# Patient Record
Sex: FEMALE | Race: WHITE | NOT HISPANIC OR LATINO | Employment: OTHER | URBAN - NONMETROPOLITAN AREA
[De-identification: names, ages, dates, MRNs, and addresses within clinical notes are randomized per-mention and may not be internally consistent; named-entity substitution may affect disease eponyms.]

---

## 2019-06-10 ENCOUNTER — OFFICE VISIT (OUTPATIENT)
Dept: URGENT CARE | Facility: CLINIC | Age: 84
End: 2019-06-10
Payer: MEDICARE

## 2019-06-10 VITALS
RESPIRATION RATE: 14 BRPM | WEIGHT: 140 LBS | HEIGHT: 62 IN | BODY MASS INDEX: 25.76 KG/M2 | TEMPERATURE: 96 F | DIASTOLIC BLOOD PRESSURE: 60 MMHG | HEART RATE: 85 BPM | OXYGEN SATURATION: 92 % | SYSTOLIC BLOOD PRESSURE: 110 MMHG

## 2019-06-10 DIAGNOSIS — J98.8 RTI (RESPIRATORY TRACT INFECTION): ICD-10-CM

## 2019-06-10 PROCEDURE — 99204 OFFICE O/P NEW MOD 45 MIN: CPT | Performed by: PHYSICIAN ASSISTANT

## 2019-06-10 RX ORDER — BENZONATATE 200 MG/1
200 CAPSULE ORAL 3 TIMES DAILY PRN
Qty: 30 CAP | Refills: 0 | Status: SHIPPED | OUTPATIENT
Start: 2019-06-10 | End: 2021-11-01

## 2019-06-10 RX ORDER — AZITHROMYCIN 250 MG/1
250 TABLET, FILM COATED ORAL DAILY
COMMUNITY
End: 2019-06-10

## 2019-06-10 RX ORDER — DOXYCYCLINE HYCLATE 100 MG
100 TABLET ORAL 2 TIMES DAILY
Qty: 14 TAB | Refills: 0 | Status: SHIPPED | OUTPATIENT
Start: 2019-06-10 | End: 2019-06-17

## 2019-06-10 RX ORDER — PREDNISONE 20 MG/1
TABLET ORAL
Qty: 6 TAB | Refills: 0 | Status: SHIPPED | OUTPATIENT
Start: 2019-06-10 | End: 2021-11-01

## 2019-06-10 RX ORDER — ESCITALOPRAM OXALATE 20 MG/1
20 TABLET ORAL DAILY
COMMUNITY

## 2019-06-10 RX ORDER — METHYLPREDNISOLONE 4 MG/1
4 TABLET ORAL DAILY
COMMUNITY
End: 2019-06-10

## 2019-06-10 RX ORDER — LEVOTHYROXINE SODIUM 75 UG/1
CAPSULE ORAL
COMMUNITY
End: 2022-06-01

## 2019-06-10 ASSESSMENT — ENCOUNTER SYMPTOMS
FEVER: 0
ABDOMINAL PAIN: 0
MYALGIAS: 0
SENSORY CHANGE: 0
WHEEZING: 0
DIARRHEA: 0
CHILLS: 0
COUGH: 1
HEMOPTYSIS: 0
HEADACHES: 1
SORE THROAT: 1
NAUSEA: 0
SPUTUM PRODUCTION: 1
SWEATS: 0
PALPITATIONS: 0
SINUS PAIN: 0
RHINORRHEA: 0
TINGLING: 0
FOCAL WEAKNESS: 0
SHORTNESS OF BREATH: 0
VOMITING: 0

## 2019-06-10 ASSESSMENT — COPD QUESTIONNAIRES: COPD: 0

## 2019-06-10 NOTE — LETTER
Kat 10, 2019         Patient: Denia Balderas   YOB: 1935   Date of Visit: 6/10/2019           To Whom it May Concern:    Denia Balderas was seen in my clinic on 6/10/2019. She is excused from work on 6/12/19 due to medical illness.    If you have any questions or concerns, please don't hesitate to call.        Sincerely,           Torri Guthrie P.A.-C.  Electronically Signed

## 2019-06-10 NOTE — PROGRESS NOTES
"Subjective:      Denia Balderas is a 84 y.o. female who presents with Cough (x4days )            Cough   This is a new problem. Episode onset: > 4 days. The problem has been unchanged. The cough is productive of sputum (yellow sputum). Associated symptoms include headaches, nasal congestion and a sore throat. Pertinent negatives include no chest pain, chills, ear pain, fever, hemoptysis, myalgias, postnasal drip, rash, rhinorrhea, shortness of breath, sweats or wheezing. Nothing aggravates the symptoms. Treatments tried: Z-mae, Medrol dosepack, Mucinex. The treatment provided no relief. There is no history of asthma, bronchitis, COPD or pneumonia.     Past Medical History:   Diagnosis Date   • Allergy    • Thyroid disease        History reviewed. No pertinent surgical history.    History reviewed. No pertinent family history.    No Known Allergies    Medications, Allergies, and current problem list reviewed today in Epic      Review of Systems   Constitutional: Positive for malaise/fatigue. Negative for chills and fever.   HENT: Positive for sore throat. Negative for congestion, ear discharge, ear pain, postnasal drip, rhinorrhea and sinus pain.    Respiratory: Positive for cough and sputum production. Negative for hemoptysis, shortness of breath and wheezing.    Cardiovascular: Negative for chest pain, palpitations and leg swelling.   Gastrointestinal: Negative for abdominal pain, diarrhea, nausea and vomiting.   Musculoskeletal: Negative for myalgias.   Skin: Negative for rash.   Neurological: Positive for headaches. Negative for tingling, sensory change and focal weakness.     All other systems reviewed and are negative.        Objective:     /60   Pulse 85   Temp (!) 35.6 °C (96 °F)   Resp 14   Ht 1.575 m (5' 2\")   Wt 63.5 kg (140 lb)   SpO2 92%   BMI 25.61 kg/m²      Physical Exam   Constitutional: She is oriented to person, place, and time. She appears well-developed and well-nourished. No " distress.   HENT:   Head: Normocephalic and atraumatic.   Right Ear: Tympanic membrane, external ear and ear canal normal.   Left Ear: Tympanic membrane, external ear and ear canal normal.   Nose: Nose normal.   Mouth/Throat: Uvula is midline, oropharynx is clear and moist and mucous membranes are normal.   Neck: Neck supple.   Cardiovascular: Normal rate, regular rhythm and normal heart sounds.  Exam reveals no gallop and no friction rub.    No murmur heard.  Pulmonary/Chest: Effort normal and breath sounds normal. No respiratory distress. She has no wheezes. She has no rales.   Deep spasmodic cough   Musculoskeletal: Normal range of motion. She exhibits no edema.   Lower extremities without edema. No calf TTP.   Lymphadenopathy:     She has no cervical adenopathy.   Neurological: She is alert and oriented to person, place, and time. No cranial nerve deficit.   Skin: Skin is warm and dry. No rash noted.   Psychiatric: She has a normal mood and affect. Her behavior is normal. Judgment and thought content normal.               Assessment/Plan:     1. RTI (respiratory tract infection)  doxycycline (VIBRAMYCIN) 100 MG Tab    predniSONE (DELTASONE) 20 MG Tab    benzonatate (TESSALON) 200 MG capsule     Stop Z-mae and Medrol.      •  doxycycline (VIBRAMYCIN) 100 MG Tab, Take 1 Tab by mouth 2 times a day for 7 days., Disp: 14 Tab, Rfl: 0  •  predniSONE (DELTASONE) 20 MG Tab, 2 tabs by mouth daily x 3 days., Disp: 6 Tab, Rfl: 0  •  benzonatate (TESSALON) 200 MG capsule, Take 1 Cap by mouth 3 times a day as needed for Cough., Disp: 30 Cap, Rfl: 0     Differential diagnoses, Supportive care, and indications for immediate follow-up discussed with patient.   Instructed to return to clinic or nearest emergency department for any change in condition, further concerns, or worsening of symptoms.    The patient demonstrated a good understanding and agreed with the treatment plan.    Torri Guthrie P.A.-C.

## 2021-11-01 ENCOUNTER — OFFICE VISIT (OUTPATIENT)
Dept: URGENT CARE | Facility: CLINIC | Age: 86
End: 2021-11-01
Payer: MEDICARE

## 2021-11-01 VITALS
SYSTOLIC BLOOD PRESSURE: 126 MMHG | TEMPERATURE: 97.8 F | BODY MASS INDEX: 26.87 KG/M2 | HEART RATE: 77 BPM | WEIGHT: 146 LBS | RESPIRATION RATE: 16 BRPM | DIASTOLIC BLOOD PRESSURE: 76 MMHG | OXYGEN SATURATION: 97 % | HEIGHT: 62 IN

## 2021-11-01 DIAGNOSIS — M25.512 ACUTE PAIN OF LEFT SHOULDER: ICD-10-CM

## 2021-11-01 PROCEDURE — 99213 OFFICE O/P EST LOW 20 MIN: CPT | Performed by: NURSE PRACTITIONER

## 2021-11-01 RX ORDER — METHYLPREDNISOLONE 4 MG/1
TABLET ORAL
Qty: 21 TABLET | Refills: 0 | Status: SHIPPED | OUTPATIENT
Start: 2021-11-01 | End: 2023-02-02

## 2021-11-01 ASSESSMENT — ENCOUNTER SYMPTOMS
TINGLING: 0
FOCAL WEAKNESS: 0
FEVER: 0
SENSORY CHANGE: 0
CHILLS: 0

## 2021-11-01 NOTE — PROGRESS NOTES
Subjective     Denia Balderas is a 86 y.o. female who presents with Shoulder Pain (L shoulder pain radiating to her L hand started few days ago)            HPI   New problem.  Patient is a 86-year-old female who presents with left shoulder pain that radiates down to her left hand times several days.  She denies injury to this area.  She has had previous issues with the shoulder that have required her to have a cortisone injection.  Patient is right-hand dominant.  She denies any focal weakness to this extremity, chest pain, or back pain.  She states the pain does radiate up into her neck.  She has been taking over-the-counter tylenol for her symptoms.    Patient has no known allergies.  Current Outpatient Medications on File Prior to Visit   Medication Sig Dispense Refill   • multivitamin (THERAGRAN) Tab Take 1 Tab by mouth every day.     • Levothyroxine Sodium 75 MCG Cap Take  by mouth.     • escitalopram (LEXAPRO) 20 MG tablet Take 20 mg by mouth every day.     • IRBESARTAN PO Take  by mouth.       No current facility-administered medications on file prior to visit.     Social History     Socioeconomic History   • Marital status:      Spouse name: Not on file   • Number of children: Not on file   • Years of education: Not on file   • Highest education level: Not on file   Occupational History   • Not on file   Tobacco Use   • Smoking status: Never Smoker   • Smokeless tobacco: Never Used   Substance and Sexual Activity   • Alcohol use: No   • Drug use: No   • Sexual activity: Not on file   Other Topics Concern   • Not on file   Social History Narrative   • Not on file     Social Determinants of Health     Financial Resource Strain:    • Difficulty of Paying Living Expenses:    Food Insecurity:    • Worried About Running Out of Food in the Last Year:    • Ran Out of Food in the Last Year:    Transportation Needs:    • Lack of Transportation (Medical):    • Lack of Transportation (Non-Medical):    Physical  "Activity:    • Days of Exercise per Week:    • Minutes of Exercise per Session:    Stress:    • Feeling of Stress :    Social Connections:    • Frequency of Communication with Friends and Family:    • Frequency of Social Gatherings with Friends and Family:    • Attends [employer] Services:    • Active Member of Clubs or Organizations:    • Attends Club or Organization Meetings:    • Marital Status:    Intimate Partner Violence:    • Fear of Current or Ex-Partner:    • Emotionally Abused:    • Physically Abused:    • Sexually Abused:      Breast Cancer-related family history is not on file.      Review of Systems   Constitutional: Negative for chills and fever.   Musculoskeletal: Positive for joint pain.   Skin: Negative for itching and rash.   Neurological: Negative for tingling, sensory change and focal weakness.              Objective     /76   Pulse 77   Temp 36.6 °C (97.8 °F)   Resp 16   Ht 1.575 m (5' 2\")   Wt 66.2 kg (146 lb)   SpO2 97%   BMI 26.70 kg/m²      Physical Exam  Vitals and nursing note reviewed.   Constitutional:       Appearance: Normal appearance. She is not ill-appearing.   Cardiovascular:      Rate and Rhythm: Normal rate and regular rhythm.      Heart sounds: No murmur heard.     Pulmonary:      Effort: Pulmonary effort is normal.      Breath sounds: Normal breath sounds.   Musculoskeletal:         General: Normal range of motion.      Left shoulder: Tenderness present. No bony tenderness or crepitus. Normal range of motion.        Arms:    Skin:     General: Skin is warm and dry.   Neurological:      General: No focal deficit present.      Mental Status: She is alert and oriented to person, place, and time.   Psychiatric:         Mood and Affect: Mood normal.         Thought Content: Thought content normal.                             Assessment & Plan        1. Acute pain of left shoulder  methylPREDNISolone (MEDROL DOSEPAK) 4 MG Tablet Therapy Pack     Possible " impingement.  Trial of medrol- no nsaids while taking this.  Icing.  Differential diagnosis, natural history, supportive care, and indications for immediate follow-up discussed at length. ]

## 2022-06-01 ENCOUNTER — OFFICE VISIT (OUTPATIENT)
Dept: URGENT CARE | Facility: CLINIC | Age: 87
End: 2022-06-01
Payer: MEDICARE

## 2022-06-01 VITALS
DIASTOLIC BLOOD PRESSURE: 68 MMHG | WEIGHT: 144.6 LBS | HEART RATE: 64 BPM | RESPIRATION RATE: 16 BRPM | TEMPERATURE: 98.3 F | BODY MASS INDEX: 27.3 KG/M2 | OXYGEN SATURATION: 97 % | HEIGHT: 61 IN | SYSTOLIC BLOOD PRESSURE: 136 MMHG

## 2022-06-01 DIAGNOSIS — L02.811 SCALP ABSCESS: ICD-10-CM

## 2022-06-01 PROCEDURE — 10061 I&D ABSCESS COMP/MULTIPLE: CPT | Performed by: PHYSICIAN ASSISTANT

## 2022-06-01 RX ORDER — ALENDRONATE SODIUM 70 MG/1
TABLET ORAL
COMMUNITY
Start: 2022-05-12

## 2022-06-01 RX ORDER — LEVOTHYROXINE SODIUM 75 UG/1
CAPSULE ORAL
COMMUNITY

## 2022-06-01 RX ORDER — DOXYCYCLINE HYCLATE 100 MG
100 TABLET ORAL 2 TIMES DAILY
Qty: 10 TABLET | Refills: 0 | Status: SHIPPED | OUTPATIENT
Start: 2022-06-01 | End: 2022-06-06

## 2022-06-01 ASSESSMENT — ENCOUNTER SYMPTOMS
HEADACHES: 0
MYALGIAS: 0
DIZZINESS: 0
COUGH: 0
CHILLS: 0
FEVER: 0
NECK PAIN: 0
PALPITATIONS: 0
NAUSEA: 0
VOMITING: 0

## 2022-06-01 NOTE — PROGRESS NOTES
Subjective:     CHIEF COMPLAINT  Chief Complaint   Patient presents with   • Bump     Behind head x 1 wk       HPI  Denia Balderas is a very pleasant 86 y.o. female who presents to the clinic with a painful bump to the left occipital scalp x1 week.  Patient states this has progressively enlarged in size over the last week.  Describes it is tender to the touch.  Believes it is an abscess.  She has been trying to apply warm compresses with no improvement.  She has not noticed any discharge or drainage.  Denies any associated fevers, chills or myalgias.  No nausea or vomiting.  No history of abscess formation.  No history of fungal infection.    REVIEW OF SYSTEMS  Review of Systems   Constitutional: Negative for chills, fever and malaise/fatigue.   Respiratory: Negative for cough.    Cardiovascular: Negative for chest pain and palpitations.   Gastrointestinal: Negative for nausea and vomiting.   Musculoskeletal: Negative for myalgias and neck pain.   Skin:        Painful bump to left occipital scalp x1 week   Neurological: Negative for dizziness and headaches.       PAST MEDICAL HISTORY  There are no problems to display for this patient.      SURGICAL HISTORY  patient denies any surgical history    ALLERGIES  Allergies   Allergen Reactions   • Amoxicillin-Pot Clavulanate Nausea   • Amoxicillin Nausea   • Shrimp Extract Allergy Skin Test Rash       CURRENT MEDICATIONS  Home Medications     Reviewed by Mando Garner P.A.-C. (Physician Assistant) on 06/01/22 at 1101  Med List Status: <None>   Medication Last Dose Status   alendronate (FOSAMAX) 70 MG Tab Taking Active   Coenzyme Q10 10 MG Cap Taking Active   escitalopram (LEXAPRO) 20 MG tablet Taking Active   IRBESARTAN PO Taking Active   Levothyroxine Sodium 75 MCG Cap Taking Active   methylPREDNISolone (MEDROL DOSEPAK) 4 MG Tablet Therapy Pack Not Taking Active   multivitamin (THERAGRAN) Tab Taking Active   vitamin D3 (CHOLECALCIFEROL) 400 UNIT Tab Taking Active        "         SOCIAL HISTORY  Social History     Tobacco Use   • Smoking status: Never Smoker   • Smokeless tobacco: Never Used   Vaping Use   • Vaping Use: Never used   Substance and Sexual Activity   • Alcohol use: No   • Drug use: No   • Sexual activity: Not Currently       FAMILY HISTORY  History reviewed. No pertinent family history.       Objective:     VITAL SIGNS: /68 (BP Location: Left arm, Patient Position: Sitting, BP Cuff Size: Large adult)   Pulse 64   Temp 36.8 °C (98.3 °F) (Temporal)   Resp 16   Ht 1.549 m (5' 1\")   Wt 65.6 kg (144 lb 9.6 oz)   SpO2 97%   BMI 27.32 kg/m²     PHYSICAL EXAM  Physical Exam  Constitutional:       General: She is not in acute distress.     Appearance: Normal appearance. She is not ill-appearing, toxic-appearing or diaphoretic.   HENT:      Head: Normocephalic and atraumatic.        Comments: 2 x 2 centimeter fluctuant abscess to the left occipital scalp.  Overlying skin is slightly erythematous with flaking present.  No active discharge or drainage.  Moderately tender to palpation.     Right Ear: Tympanic membrane, ear canal and external ear normal.      Left Ear: Tympanic membrane, ear canal and external ear normal.      Mouth/Throat:      Mouth: Mucous membranes are moist.   Eyes:      Conjunctiva/sclera: Conjunctivae normal.   Cardiovascular:      Rate and Rhythm: Normal rate and regular rhythm.      Pulses: Normal pulses.      Heart sounds: Normal heart sounds.   Pulmonary:      Effort: Pulmonary effort is normal.      Breath sounds: Normal breath sounds.   Abdominal:      General: Bowel sounds are normal.   Musculoskeletal:      Cervical back: Normal range of motion.   Lymphadenopathy:      Cervical: No cervical adenopathy.   Neurological:      General: No focal deficit present.      Mental Status: She is alert and oriented to person, place, and time. Mental status is at baseline.         Assessment/Plan:     1. Scalp abscess    - doxycycline (VIBRAMYCIN) 100 " MG Tab; Take 1 Tablet by mouth 2 times a day for 5 days.  Dispense: 10 Tablet; Refill: 0      MDM/Comments:    Verbal consent was given for incision and drainage of sclap abscess. Area was cleaned with Iodine solution. Using clean technique 1 cc of 1% lidocaine was injected around the area of the abscess with 1 cc of lidocaine injected in a superficial wheel formation above the abscess. Using a sterile blade a small incision was made on top of the abscess. Small amounts of purulent discharge and clear fluid drained from the abscess.  Loculations were broken up with a blunt hemostat.  Area was thoroughly irrigated with normal saline.  Wound care instructions given and pt tolerated procedure well.    We will start the patient on a course of doxycycline.  No renal dosage adjustment necessary.  Encourage close follow-up with PCP to monitor improvement.  Return/ED precautions discussed for any worsening pain or signs of infection.      Differential diagnosis, natural history, supportive care, and indications for immediate follow-up discussed. All questions answered. Patient agrees with the plan of care.    Follow-up as needed if symptoms worsen or fail to improve to PCP, Urgent care or Emergency Room.    I have personally reviewed prior external notes and test results pertinent to today's visit.  I have independently reviewed and interpreted all diagnostics ordered during this urgent care acute visit.   Discussed management options (risks,benefits, and alternatives to treatment). Pt expresses understanding and the treatment plan was agreed upon. Questions were encouraged and answered to pt's satisfaction.    Please note that this dictation was created using voice recognition software. I have made a reasonable attempt to correct obvious errors, but I expect that there are errors of grammar and possibly content that I did not discover before finalizing the note.

## 2023-02-02 PROBLEM — M17.11 ARTHRITIS OF RIGHT KNEE: Status: ACTIVE | Noted: 2023-02-02

## 2023-02-02 PROBLEM — G57.01 PIRIFORMIS SYNDROME, RIGHT: Status: ACTIVE | Noted: 2023-02-02

## 2023-02-02 PROBLEM — M51.36 DDD (DEGENERATIVE DISC DISEASE), LUMBAR: Status: ACTIVE | Noted: 2023-02-02

## 2023-10-10 ENCOUNTER — APPOINTMENT (RX ONLY)
Dept: URBAN - METROPOLITAN AREA CLINIC 31 | Facility: CLINIC | Age: 88
Setting detail: DERMATOLOGY
End: 2023-10-10

## 2023-10-10 DIAGNOSIS — L29.89 OTHER PRURITUS: ICD-10-CM

## 2023-10-10 DIAGNOSIS — L29.8 OTHER PRURITUS: ICD-10-CM

## 2023-10-10 PROCEDURE — 99203 OFFICE O/P NEW LOW 30 MIN: CPT

## 2023-10-10 PROCEDURE — ? COUNSELING

## 2023-10-10 PROCEDURE — ? ORDER TESTS

## 2023-10-10 ASSESSMENT — LOCATION ZONE DERM
LOCATION ZONE: FACE
LOCATION ZONE: SCALP
LOCATION ZONE: TRUNK

## 2023-10-10 ASSESSMENT — LOCATION SIMPLE DESCRIPTION DERM
LOCATION SIMPLE: CHEST
LOCATION SIMPLE: LEFT CHEEK
LOCATION SIMPLE: UPPER BACK
LOCATION SIMPLE: POSTERIOR SCALP

## 2023-10-10 ASSESSMENT — LOCATION DETAILED DESCRIPTION DERM
LOCATION DETAILED: LEFT CENTRAL MALAR CHEEK
LOCATION DETAILED: SUPERIOR THORACIC SPINE
LOCATION DETAILED: LOWER STERNUM
LOCATION DETAILED: POSTERIOR MID-PARIETAL SCALP

## 2023-10-10 NOTE — HPI: RASH
What Type Of Note Output Would You Prefer (Optional)?: Standard Output
Is The Patient Presenting As Previously Scheduled?: Yes
Is This A New Presentation, Or A Follow-Up?: Rash
Additional History: Per patient she had a hernia repair surgery 2 weeks ago and that is when the rash started.

## 2023-10-10 NOTE — PROCEDURE: ORDER TESTS
Billing Type: Third-Party Bill
Expected Date Of Service: 10/10/2023
Lab Facility: [id number]
Bill For Surgical Tray: no
Performing Laboratory: [id number]